# Patient Record
Sex: MALE | Race: WHITE | Employment: FULL TIME | URBAN - METROPOLITAN AREA
[De-identification: names, ages, dates, MRNs, and addresses within clinical notes are randomized per-mention and may not be internally consistent; named-entity substitution may affect disease eponyms.]

---

## 2017-02-14 ENCOUNTER — OFFICE VISIT (OUTPATIENT)
Dept: UROLOGY | Facility: CLINIC | Age: 32
End: 2017-02-14
Payer: COMMERCIAL

## 2017-02-14 VITALS — RESPIRATION RATE: 12 BRPM | HEART RATE: 78 BPM | DIASTOLIC BLOOD PRESSURE: 98 MMHG | SYSTOLIC BLOOD PRESSURE: 140 MMHG

## 2017-02-14 DIAGNOSIS — N50.82 SCROTAL PAIN: Primary | ICD-10-CM

## 2017-02-14 PROCEDURE — 99213 OFFICE O/P EST LOW 20 MIN: CPT | Performed by: UROLOGY

## 2017-02-14 RX ORDER — DIPHENOXYLATE HYDROCHLORIDE AND ATROPINE SULFATE 2.5; .025 MG/1; MG/1
1 TABLET ORAL
COMMUNITY
Start: 2016-04-26

## 2017-02-14 RX ORDER — TRIAMCINOLONE ACETONIDE 1 MG/G
CREAM TOPICAL
COMMUNITY
Start: 2015-12-02 | End: 2017-02-14

## 2017-02-14 RX ORDER — DESONIDE 0.5 MG/G
CREAM TOPICAL
COMMUNITY
Start: 2017-02-14

## 2017-02-14 ASSESSMENT — PAIN SCALES - GENERAL: PAINLEVEL: MODERATE PAIN (4)

## 2017-02-14 NOTE — MR AVS SNAPSHOT
After Visit Summary   2/14/2017    Pietro Amaro    MRN: [id number]           Patient Information     Date Of Birth          1985        Visit Information        Provider Department      2/14/2017 3:15 PM KENY Cowart MD [facility]        Today's Diagnoses     Scrotal pain    -  1      Care Instructions    Per Physician's instructions          Follow-ups after your visit        Who to contact     If you have questions or need follow up information about today's clinic visit or your schedule please contact [facility] directly at [phone].  Normal or non-critical lab and imaging results will be communicated to you by ReVision Opticshart, letter or phone within 4 business days after the clinic has received the results. If you do not hear from us within 7 days, please contact the clinic through Avegantt or phone. If you have a critical or abnormal lab result, we will notify you by phone as soon as possible.  Submit refill requests through Interhyp or call your pharmacy and they will forward the refill request to us. Please allow 3 business days for your refill to be completed.          Additional Information About Your Visit        MyChart Information     Interhyp gives you secure access to your electronic health record. If you see a primary care provider, you can also send messages to your care team and make appointments. If you have questions, please call your primary care clinic.  If you do not have a primary care provider, please call [phone] and they will assist you.        Care EveryWhere ID     This is your Care EveryWhere ID. This could be used by other organizations to access your [place] medical records  [id number]        Your Vitals Were     Pulse Respirations                78 12           Blood Pressure from Last 3 Encounters:   02/14/17 (!) 140/98   04/03/14 147/90   06/07/13 136/89    Weight from Last 3 Encounters:   04/03/14 188 lb (85.3 kg)   06/07/13  185 lb (83.9 kg)   04/23/13 189 lb (85.7 kg)              Today, you had the following     No orders found for display         Today's Medication Changes          These changes are accurate as of: 2/14/17 11:59 PM.  If you have any questions, ask your nurse or doctor.               Stop taking these medicines if you haven't already. Please contact your care team if you have questions.     doxycycline 100 MG capsule   Commonly known as:  VIBRAMYCIN   Stopped by:  KENY Cowart MD           NO ACTIVE MEDICATIONS   Stopped by:  KENY Cowart MD           triamcinolone 0.1 % cream   Commonly known as:  KENALOG   Stopped by:  KENY Cowart MD                    Primary Care Provider Office Phone # Fax #    Sam Ingram [phone]       [facility] [address] [facility] [address]        Thank you!     Thank you for choosing [facility]  for your care. Our goal is always to provide you with excellent care. Hearing back from our patients is one way we can continue to improve our services. Please take a few minutes to complete the written survey that you may receive in the mail after your visit with us. Thank you!             Your Updated Medication List - Protect others around you: Learn how to safely use, store and throw away your medicines at www.disposemymeds.org.          This list is accurate as of: 2/14/17 11:59 PM.  Always use your most recent med list.                   Brand Name Dispense Instructions for use    desonide 0.05 % cream    DESOWEN         MULTI-VITAMINS Tabs      Take 1 tablet by mouth

## 2017-02-14 NOTE — NURSING NOTE
"Chief Complaint   Patient presents with     Groin Pain       Initial BP (!) 140/98 (BP Location: Right arm, Patient Position: Right side, Cuff Size: Adult Large)  Pulse 78  Resp 12 Estimated body mass index is 29.44 kg/(m^2) as calculated from the following:    Height as of 4/3/14: 5' 7\" (1.702 m).    Weight as of 4/3/14: 188 lb (85.3 kg).  Medication Reconciliation: complete     Carmel Mims MA      "

## 2017-02-15 NOTE — PROGRESS NOTES
Appointment source: Established Patient  Patient name: Pietro Amaro  Urology Staff: Ken Cowart MD    Subjective: This is a 31 year old year old male returning for follow up of chronic scrotal pain    Objective:  Had bilateral hydrocele repair in 2013 which had minimal impact on his chronic scrotal discomfort.    Pain goes away after ejaculation.    On examination today there are no palpable abnormalities other than some degree of discomfort with palpation right worse than left and thickening of the intrascrotal posterior aspect of each spermatic cord/epidiymides consistent with prior hydrocele surgery.    No evidence of inguinal hernia.    The level of pain is manageable and does not require the use of any analgesics.    He is aware of the improbability of alleviating this problem after so many years but would like to continue exploring options.     Assessment:  Chronic scrotal pain.    Plan:  I suggested that he see Dr. Waite.    Total time 20 minutes, counseling 15 minutes discussing management of chronic scrotal pain

## 2017-03-02 ENCOUNTER — TELEPHONE (OUTPATIENT)
Dept: UROLOGY | Facility: CLINIC | Age: 32
End: 2017-03-02

## 2017-03-02 NOTE — TELEPHONE ENCOUNTER
Spoke to pt and advised that he follow up with Dr. Waite per LOV note. Pt agreed with plan and stated he thought the clinic at [facility] was supposed to call him with an appointment.  Called [facility] and first appointment for patient was April 14th, but they stated they could not call the pt from appointment line to make appt with pt.  Advised pt to call and make the appt.  Informed him that Dr. Cowart would be consulted to see if waiting until April was ok. Pt verbalized agreement with plan. Please advise if Pt can wait to be seen until April 14th. Thank you.  Indira SERNA RN BSN PHN  Specialty Clinics

## 2017-03-02 NOTE — TELEPHONE ENCOUNTER
Reason for Call:  Other call back    Detailed comments: pt calling stating he would like to know if Dr. Cowart followed up w/ the other provider he discussed at last visit. Would like to know what the next step is?    Phone Number Patient can be reached at: Home number on file [phone] (home)    Best Time: any     Can we leave a detailed message on this number? YES    Call taken on 3/2/2017 at 9:17 AM by Mandie Greene

## 2017-03-03 ENCOUNTER — OFFICE VISIT (OUTPATIENT)
Dept: UROLOGY | Facility: CLINIC | Age: 32
End: 2017-03-03

## 2017-03-03 VITALS
HEIGHT: 66 IN | WEIGHT: 195 LBS | BODY MASS INDEX: 31.34 KG/M2 | SYSTOLIC BLOOD PRESSURE: 158 MMHG | HEART RATE: 90 BPM | DIASTOLIC BLOOD PRESSURE: 102 MMHG

## 2017-03-03 DIAGNOSIS — N45.1 EPIDIDYMITIS, BILATERAL: Primary | ICD-10-CM

## 2017-03-03 DIAGNOSIS — N45.1 EPIDIDYMITIS, BILATERAL: ICD-10-CM

## 2017-03-03 PROCEDURE — 89322 SEMEN ANAL STRICT CRITERIA: CPT

## 2017-03-03 ASSESSMENT — ENCOUNTER SYMPTOMS
SMELL DISTURBANCE: 0
SINUS CONGESTION: 1
SORE THROAT: 0
TROUBLE SWALLOWING: 0
SINUS PAIN: 1
HOARSE VOICE: 0
TASTE DISTURBANCE: 0
NECK MASS: 0

## 2017-03-03 ASSESSMENT — PAIN SCALES - GENERAL: PAINLEVEL: MODERATE PAIN (4)

## 2017-03-03 NOTE — MR AVS SNAPSHOT
After Visit Summary   3/3/2017    Pietro Amaro    MRN: [id number]           Patient Information     Date Of Birth          1985        Visit Information        Provider Department      3/3/2017 7:20 AM Nawaf Waite MD [facility] and [facility] for Prostate and Urologic Cancers        Today's Diagnoses     Epididymitis, bilateral    -  1      Care Instructions    Schedule appointment for semen analysis.    Schedule appointment for testicular ultrasound.    Follow up with Dr. Waite for cord block procedure.    It was a pleasure meeting with you today.  Thank you for allowing me and my team the privilege of caring for you today.  YOU are the reason we are here, and I truly hope we provided you with the excellent service you deserve.  Please let us know if there is anything else we can do for you so that we can be sure you are leaving completely satisfied with your care experience.      Shahrzad Espinoza JOSHUA        Follow-ups after your visit        Your next 10 appointments already scheduled     Mar 04, 2017  3:00 PM CST   US TESTICULAR AND SCROTUM with UCUS3   [facility] US ([facility])    [address] [place] [address] [phone]           Please bring a list of your medicines (including vitamins, minerals and over-the-counter drugs). Also, tell your doctor about any allergies you may have. Wear comfortable clothes and leave your valuables at home.  You do not need to do anything special to prepare for your exam.  Please call the Imaging Department at your exam site with any questions.            Mar 23, 2017  7:00 AM CDT   (Arrive by 6:45 AM)   Return Visit with Nawaf Waite MD   [facility] and [facility] for Prostate and Urologic Cancers ([facility])    [address] [place] [address]  [address] [facility] [address] [phone]              Future tests that were ordered for you today   "   Open Future Orders        Priority Expected Expires Ordered    US Testicular and Scrotum Routine 6/1/2017 3/3/2018 3/3/2017    Semen Analysis, Strict Morphology ([facility]) Routine 6/11/2017 3/4/2018 3/3/2017            Who to contact     Please call your clinic at [phone] to:    Ask questions about your health    Make or cancel appointments    Discuss your medicines    Learn about your test results    Speak to your doctor   If you have compliments or concerns about an experience at your clinic, or if you wish to file a complaint, please contact [facility] Patient Relations at [phone] or email us at [email or web address]         Additional Information About Your Visit        ResponseTek Information     ResponseTek gives you secure access to your electronic health record. If you see a primary care provider, you can also send messages to your care team and make appointments. If you have questions, please call your primary care clinic.  If you do not have a primary care provider, please call [phone] and they will assist you.      ResponseTek is an electronic gateway that provides easy, online access to your medical records. With ResponseTek, you can request a clinic appointment, read your test results, renew a prescription or communicate with your care team.     To access your existing account, please contact your [facility] or call [phone] for assistance.        Care EveryWhere ID     This is your Care EveryWhere ID. This could be used by other organizations to access your [facility] medical records  [id number]        Your Vitals Were     Pulse Height BMI (Body Mass Index)             90 1.676 m (5' 6\") 31.47 kg/m2          Blood Pressure from Last 3 Encounters:   03/03/17 (!) 158/102   02/14/17 (!) 140/98   04/03/14 147/90    Weight from Last 3 Encounters:   03/03/17 88.5 kg (195 lb)   04/03/14 85.3 kg (188 lb)   06/07/13 83.9 kg (185 lb)            "    Primary Care Provider Office Phone # Fax #    Sam Ingram [phone]       [facility] [address] [facility] [address]        Thank you!     Thank you for choosing [facility]OLOG[facility] AND [place] FOR PROSTATE AND UROLOGIC CANCERS  for your care. Our goal is always to provide you with excellent care. Hearing back from our patients is one way we can continue to improve our services. Please take a few minutes to complete the written survey that you may receive in the mail after your visit with us. Thank you!             Your Updated Medication List - Protect others around you: Learn how to safely use, store and throw away your medicines at www.disposemymeds.org.          This list is accurate as of: 3/3/17  8:08 AM.  Always use your most recent med list.                   Brand Name Dispense Instructions for use    desonide 0.05 % cream    DESOWEN         MULTI-VITAMINS Tabs      Take 1 tablet by mouth

## 2017-03-03 NOTE — NURSING NOTE
"Chief Complaint   Patient presents with     Consult     New patient consult for testicular pain       Initial Ht 1.676 m (5' 6\")  Wt 88.5 kg (195 lb)  BMI 31.47 kg/m2 Estimated body mass index is 31.47 kg/(m^2) as calculated from the following:    Height as of this encounter: 1.676 m (5' 6\").    Weight as of this encounter: 88.5 kg (195 lb).  Medication Reconciliation: complete     VEL Morin    "

## 2017-03-03 NOTE — LETTER
"3/3/2017       RE: Pietro Amaro  [address] [place] [address]     Dear Colleague,    Thank you for referring your patient, Pietro Amaro, to the [facility] AND [facility] FOR PROSTATE AND UROLOGIC CANCERS at [facility]. Please see a copy of my visit note below.      Pt with chronic bilateral scrotal pain.  Referral from Dr. Cowart.      Bilateral varicocele repair 2004, bilateral testis ache predates that surgery.    Bilateral hydrocele repair 2013, did not reduce the pain.       Pain goes away after ejaculation.  Comes back 2-3d after ejaculation.    multiple courses of antibiotics have not helped.  Does have back pain- L5 injury 2013, sees a chiropractor for that.  Has not tried pelvic floor PT.  NSAIDS do not help.    Pain is not positional.  Ice helps a little bit.  Skin gets red and hot when pain present.   ? Chronic regional pain syndrome.    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    Answers for HPI/ROS submitted by the patient on 3/3/2017   General Symptoms: No  Skin Symptoms: No  HENT Symptoms: Yes  EYE SYMPTOMS: No  HEART SYMPTOMS: No  LUNG SYMPTOMS: No  INTESTINAL SYMPTOMS: No  URINARY SYMPTOMS: No  REPRODUCTIVE SYMPTOMS: No  SKELETAL SYMPTOMS: No  BLOOD SYMPTOMS: No  NERVOUS SYSTEM SYMPTOMS: No  MENTAL HEALTH SYMPTOMS: No  Ear pain: No  Ear discharge: No  Hearing loss: No  Tinnitus: Yes  Nosebleeds: No  Congestion: Yes  Sinus pain: Yes  Trouble swallowing: No   Voice hoarseness: No  Mouth sores: No  Sore throat: No  Tooth pain: No  Gum tenderness: No  Bleeding gums: No  Change in taste: No  Change in sense of smell: No  Dry mouth: No  Hearing aid used: No  Neck lump: No    EXAM:  BP (!) 158/102  Pulse 90  Ht 1.676 m (5' 6\")  Wt 88.5 kg (195 lb)  BMI 31.47 kg/m2   :no inguinal hernias, normal scrotum, penis,  testes normal - minimally tender to palpation, very sensitive epididymis bilaterally.  rectal exam deferred    A-  Chronic " epididymis pain    Plan  - Chronic pain in epididymis. Options I discussed with him today were:  1.  Observation  2. Conservative management with sitz bath, nsaids, repeat trial abx.  3. Referral to pain specialist such as Dr. Subhash Green From [facility] for regional anesthesia or nerve block procedures.  4. Epididymectomy ( estimate 90% improvement, 60% pain cure)  5. If he responds completely to a cord block with 1% lidocaine: microscopic denervation of the spermatic cord ( estimate 90% chance pain improvement, 80+% chance pain cure in my personal experience).  6. Orchiectomy, estimate ~95% pain cure, but could have phantom pain.     We decided to have him do a semen analysis to see if he has bilateral epididymis obstruction or not.      Semen analysis ASAP and return to clinic for spermatic cord block.  Would probably have him see pain clinic before microscopic denervation of the spermatic cord given skin color changes that may be typical for RSD/ CRPS.    Martha IVY     25min visit, over 50% face to face in counseling/discussion of above pain management options.

## 2017-03-03 NOTE — PATIENT INSTRUCTIONS
Schedule appointment for semen analysis.    Schedule appointment for testicular ultrasound.    Follow up with Dr. Waite for cord block procedure.    It was a pleasure meeting with you today.  Thank you for allowing me and my team the privilege of caring for you today.  YOU are the reason we are here, and I truly hope we provided you with the excellent service you deserve.  Please let us know if there is anything else we can do for you so that we can be sure you are leaving completely satisfied with your care experience.      VEL Morin

## 2017-03-03 NOTE — PROGRESS NOTES
"  Pt with chronic bilateral scrotal pain.  Referral from Dr. Cowart.      Bilateral varicocele repair 2004, bilateral testis ache predates that surgery.    Bilateral hydrocele repair 2013, did not reduce the pain.       Pain goes away after ejaculation.  Comes back 2-3d after ejaculation.    multiple courses of antibiotics have not helped.  Does have back pain- L5 injury 2013, sees a chiropractor for that.  Has not tried pelvic floor PT.  NSAIDS do not help.    Pain is not positional.  Ice helps a little bit.  Skin gets red and hot when pain present.   ? Chronic regional pain syndrome.    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    Answers for HPI/ROS submitted by the patient on 3/3/2017   General Symptoms: No  Skin Symptoms: No  HENT Symptoms: Yes  EYE SYMPTOMS: No  HEART SYMPTOMS: No  LUNG SYMPTOMS: No  INTESTINAL SYMPTOMS: No  URINARY SYMPTOMS: No  REPRODUCTIVE SYMPTOMS: No  SKELETAL SYMPTOMS: No  BLOOD SYMPTOMS: No  NERVOUS SYSTEM SYMPTOMS: No  MENTAL HEALTH SYMPTOMS: No  Ear pain: No  Ear discharge: No  Hearing loss: No  Tinnitus: Yes  Nosebleeds: No  Congestion: Yes  Sinus pain: Yes  Trouble swallowing: No   Voice hoarseness: No  Mouth sores: No  Sore throat: No  Tooth pain: No  Gum tenderness: No  Bleeding gums: No  Change in taste: No  Change in sense of smell: No  Dry mouth: No  Hearing aid used: No  Neck lump: No    EXAM:  BP (!) 158/102  Pulse 90  Ht 1.676 m (5' 6\")  Wt 88.5 kg (195 lb)  BMI 31.47 kg/m2   :no inguinal hernias, normal scrotum, penis,  testes normal - minimally tender to palpation, very sensitive epididymis bilaterally.  rectal exam deferred    A-  Chronic epididymis pain    Plan  - Chronic pain in epididymis. Options I discussed with him today were:  1.  Observation  2. Conservative management with sitz bath, nsaids, repeat trial abx.  3. Referral to pain specialist such as Dr. Subhash Green From [facility] for regional anesthesia or nerve block procedures.  4. Epididymectomy ( " estimate 90% improvement, 60% pain cure)  5. If he responds completely to a cord block with 1% lidocaine: microscopic denervation of the spermatic cord ( estimate 90% chance pain improvement, 80+% chance pain cure in my personal experience).  6. Orchiectomy, estimate ~95% pain cure, but could have phantom pain.     We decided to have him do a semen analysis to see if he has bilateral epididymis obstruction or not.      Semen analysis ASAP and return to clinic for spermatic cord block.  Would probably have him see pain clinic before microscopic denervation of the spermatic cord given skin color changes that may be typical for RSD/ CRPS.    Martha IVY     25min visit, over 50% face to face in counseling/discussion of above pain management options.

## 2017-03-07 LAB
ABNORMAL SPERM: 92 MORPHOLOGY
ABSTINENCE DAYS: 2 DAYS (ref 2–7)
AGGLUTINATION: NORMAL YES/NO
ANALYSIS TEMP - CENTIGRADE: 22 CENTIGRADE
CELL FRAGMENTS: NORMAL %
COLLECTION METHOD: NORMAL
COLLECTION SITE: NORMAL
CONSENT TO RELEASE TO PARTNER: YES
HEAD DEFECT: 92
IMMATURE SPERM: NORMAL %
IMMOTILE: 24 %
LAB RECEIPT TIME: NORMAL
LIQUEFIED: NORMAL YES/NO
MIDPIECE DEFECT: 31
NON-PROGRESSIVE MOTILITY: 3 %
NORMAL SPERM: 8 % NORMAL FORMS (ref 4–?)
PROGRESSIVE MOTILITY: 73 % (ref 32–?)
ROUND CELLS: 0.8 MILLION/ML (ref ?–2)
SPECIMEN CONCENTRATION: 80 MILLION/ML (ref 15–?)
SPECIMEN PH: 8 PH (ref 7.2–?)
SPECIMEN TYPE: NORMAL
SPECIMEN VOL UR: 4.4 ML (ref 1.5–?)
TAIL DEFECT: 6
TIME OF ANALYSIS: NORMAL
TOTAL NUMBER: 352 MILLION (ref 39–?)
TOTAL PROGRESSIVE MOTILE: 257 MILLION (ref 15.6–?)
VISCOUS: NORMAL YES/NO
VITALITY: NORMAL % (ref 58–?)
WBC SPECIMEN: NORMAL %

## 2017-03-07 NOTE — PROGRESS NOTES
Dear Pietro,   Here are your recent results.      Your semen analysis is normal.  I doubt there is any blockage of sperm whatsoever.  Thank You  Please let me know if you have any questions.      Nawaf Waite MD

## 2017-03-22 ENCOUNTER — PRE VISIT (OUTPATIENT)
Dept: UROLOGY | Facility: CLINIC | Age: 32
End: 2017-03-22

## 2017-03-22 NOTE — TELEPHONE ENCOUNTER
Patient coming in for spermatic cord block. Left message with patient about procedure and to call clinic with questions. Patient has completed imaging and semen analysis and are in epic

## 2017-03-23 ENCOUNTER — OFFICE VISIT (OUTPATIENT)
Dept: UROLOGY | Facility: CLINIC | Age: 32
End: 2017-03-23

## 2017-03-23 VITALS — HEART RATE: 70 BPM | SYSTOLIC BLOOD PRESSURE: 144 MMHG | DIASTOLIC BLOOD PRESSURE: 70 MMHG

## 2017-03-23 DIAGNOSIS — N50.819 EPIDIDYMAL PAIN: Primary | ICD-10-CM

## 2017-03-23 RX ORDER — DOXYCYCLINE 100 MG/1
100 CAPSULE ORAL 2 TIMES DAILY
Qty: 42 CAPSULE | Refills: 1 | Status: SHIPPED | OUTPATIENT
Start: 2017-03-23 | End: 2017-06-15

## 2017-03-23 ASSESSMENT — PAIN SCALES - GENERAL: PAINLEVEL: MILD PAIN (3)

## 2017-03-23 NOTE — MR AVS SNAPSHOT
After Visit Summary   3/23/2017    Pietro Amaro    MRN: [id number]           Patient Information     Date Of Birth          1985        Visit Information        Provider Department      3/23/2017 7:00 AM Nawaf Waite MD [facility] and [facility] for Prostate and Urologic Cancers        Today's Diagnoses     Epididymal pain    -  1       Follow-ups after your visit        Follow-up notes from your care team     Return if symptoms worsen or fail to improve.      Who to contact     Please call your clinic at [phone] to:    Ask questions about your health    Make or cancel appointments    Discuss your medicines    Learn about your test results    Speak to your doctor   If you have compliments or concerns about an experience at your clinic, or if you wish to file a complaint, please contact [facility] Patient Relations at [phone] or email us at [email or web address]         Additional Information About Your Visit        MyChart Information     S[employer] Healt[employer] gives you secure access to your electronic health record. If you see a primary care provider, you can also send messages to your care team and make appointments. If you have questions, please call your primary care clinic.  If you do not have a primary care provider, please call [phone] and they will assist you.      Wingz is an electronic gateway that provides easy, online access to your medical records. With Wingz, you can request a clinic appointment, read your test results, renew a prescription or communicate with your care team.     To access your existing account, please contact your [facility] or call [phone] for assistance.        Care EveryWhere ID     This is your Care EveryWhere ID. This could be used by other organizations to access your [facility] medical records  [id number]        Your Vitals Were     Pulse                   70             Blood Pressure from Last 3 Encounters:   03/23/17 144/70   03/03/17 (!) 158/102   02/14/17 (!) 140/98    Weight from Last 3 Encounters:   03/03/17 88.5 kg (195 lb)   04/03/14 85.3 kg (188 lb)   06/07/13 83.9 kg (185 lb)              We Performed the Following     INJECTION NERVE BLOCK, OTHER PERIPHERAL          Today's Medication Changes          These changes are accurate as of: 3/23/17  7:52 AM.  If you have any questions, ask your nurse or doctor.               Start taking these medicines.        Dose/Directions    doxycycline 100 MG capsule   Commonly known as:  VIBRAMYCIN   Used for:  Epididymal pain   Started by:  Nawaf Waite MD        Dose:  100 mg   Take 1 capsule (100 mg) by mouth 2 times daily   Quantity:  42 capsule   Refills:  1            Where to get your medicines      These medications were sent to [facility] [address] [place] [address]  [address] [facility] [address]     Phone:  [phone]     doxycycline 100 MG capsule                Primary Care Provider Office Phone # Fax #    Sam Ingram [phone]       [facility] [address] [facility] [address]        Thank you!     Thank you for choosing [facility] UROLOGY AND [facility] FOR PROSTATE AND UROLOGIC CANCERS  for your care. Our goal is always to provide you with excellent care. Hearing back from our patients is one way we can continue to improve our services. Please take a few minutes to complete the written survey that you may receive in the mail after your visit with us. Thank you!             Your Updated Medication List - Protect others around you: Learn how to safely use, store and throw away your medicines at www.disposemymeds.org.          This list is accurate as of: 3/23/17  7:52 AM.  Always use your most recent med list.                   Brand Name Dispense Instructions for use    desonide 0.05 % cream    DESOWEN         doxycycline 100 MG capsule     VIBRAMYCIN    42 capsule    Take 1 capsule (100 mg) by mouth 2 times daily       MULTI-VITAMINS Tabs      Take 1 tablet by mouth

## 2017-03-23 NOTE — PROGRESS NOTES
Pt here with chronic bilateral epididymis pain L>R.  Pain resolves for 2-3 days with ejaculation , then recurs.   Semen analysis was normal on all measures.    Procedure Note    Pre-operative diagnosis: Left scrotal pain   Post-operative diagnosis same   Procedure: Left cord block   Surgeon: Nawaf Waite MD   Assistants(s): none   Estimated blood loss: None    Specimens: None   Findings: >95% relief of pain after injection.      Pt positioned supine.  Genitals prepped.    Left cord block done today, 10cc 2% lidocaine into the upper left spermatic cord.  Pt had essentially complete resolution of the scrotal pain immediately after the injection, better than 95% relief.    He'd tried only Cipro as dedicated epididymis antibiotics.  Will try doxycycline x 3 weeks.    Assessment-  Bilateral epididymis pain.  Left side pain resolved with left-sided cord block.    Plan  - try doxycycline  X 3 weeks.  - if antibiotics not effective and pain bad enough to warrant microscopic denervation of the spermatic cord, he'll call to schedule surgery for left denervation.    10min visit, over 50% face to face in counseling/discussion of chronic scrotal pain.

## 2017-03-23 NOTE — LETTER
3/23/2017       RE: Pietro Amaro  [address] [place] [address]     Dear Colleague,    Thank you for referring your patient, Pietro Amaro, to the [facility] UROLOGY AND INST FOR PROSTATE AND UROLOGIC CANCERS at [facility]. Please see a copy of my visit note below.    Pt here with chronic bilateral epididymis pain L>R.  Pain resolves for 2-3 days with ejaculation , then recurs.   Semen analysis was normal on all measures.    Procedure Note    Pre-operative diagnosis: Left scrotal pain   Post-operative diagnosis same   Procedure: Left cord block   Surgeon: Nawaf Waite MD   Assistants(s): none   Estimated blood loss: None    Specimens: None   Findings: >95% relief of pain after injection.      Pt positioned supine.  Genitals prepped.    Left cord block done today, 10cc 2% lidocaine into the upper left spermatic cord.  Pt had essentially complete resolution of the scrotal pain immediately after the injection, better than 95% relief.    He'd tried only Cipro as dedicated epididymis antibiotics.  Will try doxycycline x 3 weeks.    Assessment-  Bilateral epididymis pain.  Left side pain resolved with left-sided cord block.    Plan  - try doxycycline  X 3 weeks.  - if antibiotics not effective and pain bad enough to warrant microscopic denervation of the spermatic cord, he'll call to schedule surgery for left denervation.    10min visit, over 50% face to face in counseling/discussion of chronic scrotal pain.    Again, thank you for allowing me to participate in the care of your patient.      Sincerely,    Nawaf Waite MD

## 2017-03-23 NOTE — NURSING NOTE
Chief Complaint   Patient presents with     RECHECK     testicle pain/spermatic cord block     Invasive Procedure Safety Checklist:    Procedure: cord block    Action: Complete sections and checkboxes as appropriate.    Pre-procedure:  1. Patient ID Verified with 2 identifiers (Susanna and  or MRN) : YES    2. Procedure and site verified with patient/designee (when able) : YES    3. Accurate consent documentation in medical record : YES    4. H&P (or appropriate assessment) documented in medical record : NO  H&P must be up to 30 days prior to procedure an updated within 24 hours of                 Procedure as applicable.     5. Relevant diagnostic and radiology test results appropriately labeled and displayed as applicable : NO    6. Blood products, implants, devices, and/or special equipment available for the procedure as applicable : NO    7. Procedure site(s) marked with provider initials [Exclusions: none] : NO    8. Marking not required. Reason : Yes  Procedure does not require site marking    Time Out:     Time-Out performed immediately prior to starting procedure, including verbal and active participation of all team members addressing: YES    1. Correct patient identity.  2. Confirmed that the correct side and site are marked.  3. An accurate procedure to be done.  4. Agreement on the procedure to be done.  5. Correct patient position.  6. Relevant images and results are properly labeled and appropriately displayed.  7. The need to administer antibiotics or fluids for irrigation purposes during the procedure as applicable.  8. Safety precautions based on patient history or medication use.    During Procedure: Verification of correct person, site, and procedure occurs any time the responsibility for care of the patient is transferred to another member of the care team.    Julio CROWDER

## 2017-03-23 NOTE — NURSING NOTE
Lidocaine 2%  Drug Amount given = 10 ml  Drug Amount wasted = 10 ml  [id number]      Julio Lopez CMA  March 23, 2017

## 2017-06-08 ENCOUNTER — PRE VISIT (OUTPATIENT)
Dept: UROLOGY | Facility: CLINIC | Age: 32
End: 2017-06-08

## 2017-06-08 NOTE — TELEPHONE ENCOUNTER
Patient coming in for Cord block. Patient saw Dr Waite on 3/23/17. My chart message sent to patient

## 2017-06-15 ENCOUNTER — OFFICE VISIT (OUTPATIENT)
Dept: UROLOGY | Facility: CLINIC | Age: 32
End: 2017-06-15

## 2017-06-15 VITALS
DIASTOLIC BLOOD PRESSURE: 91 MMHG | HEIGHT: 66 IN | WEIGHT: 195 LBS | HEART RATE: 105 BPM | BODY MASS INDEX: 31.34 KG/M2 | SYSTOLIC BLOOD PRESSURE: 162 MMHG

## 2017-06-15 DIAGNOSIS — N50.819 EPIDIDYMAL PAIN: Primary | ICD-10-CM

## 2017-06-15 ASSESSMENT — PAIN SCALES - GENERAL: PAINLEVEL: MILD PAIN (3)

## 2017-06-15 NOTE — LETTER
"6/15/2017       RE: Pietro Amaro  [address] [place] [address]     Dear Colleague,    Thank you for referring your patient, Pietro Amaro, to the [facility] UROLOGY AND INST FOR PROSTATE AND UROLOGIC CANCERS at [facility]. Please see a copy of my visit note below.      Pt with persisting epididymal hypertension ( \"blue balls\").  He responded partially to a cord block at the last visit but still felt pressure even though testis was numbed.  He's not interested in surgery at this time.  Left side more painful than right side.    I discussed with him pain clinic or neurology referral- it does not seem like either would be of much yield.  We discussed doing a vasoepididymostomy, but again not sure if this would help or make things worse.    A-   Epididymal HTN.    Plan  - continue with frequent sexual release as this fixes the pain every time.  - PRN urology follow-up.    Symptoms are completely relieved by ejaculation but return in 2-3 days.    15min visit, over 50% face to face in counseling/discussion of pain management.    Again, thank you for allowing me to participate in the care of your patient.    Nawaf Waite MD    "

## 2017-06-15 NOTE — PROGRESS NOTES
"  Pt with persisting epididymal hypertension ( \"blue balls\").  He responded partially to a cord block at the last visit but still felt pressure even though testis was numbed.  He's not interested in surgery at this time.  Left side more painful than right side.    I discussed with him pain clinic or neurology referral- it does not seem like either would be of much yield.  We discussed doing a vasoepididymostomy, but again not sure if this would help or make things worse.    A-   Epididymal HTN.    Plan  - continue with frequent sexual release as this fixes the pain every time.  - PRN urology follow-up.    Symptoms are completely relieved by ejaculation but return in 2-3 days.    Martha IVY       15min visit, over 50% face to face in counseling/discussion of pain management.  "

## 2017-06-15 NOTE — MR AVS SNAPSHOT
After Visit Summary   6/15/2017    Pietro Amaro    MRN: [id number]           Patient Information     Date Of Birth          1985        Visit Information        Provider Department      6/15/2017 11:20 AM Nawaf Waite MD [facility] Urology and [facility] for Prostate and Urologic Cancers        Today's Diagnoses     Epididymal pain    -  1       Follow-ups after your visit        Follow-up notes from your care team     Return if symptoms worsen or fail to improve.      Who to contact     Please call your clinic at [phone] to:    Ask questions about your health    Make or cancel appointments    Discuss your medicines    Learn about your test results    Speak to your doctor   If you have compliments or concerns about an experience at your clinic, or if you wish to file a complaint, please contact [facility] Patient Relations at [phone] or email us at [email or web address]         Additional Information About Your Visit        MyChart Information     [employer] gives you secure access to your electronic health record. If you see a primary care provider, you can also send messages to your care team and make appointments. If you have questions, please call your primary care clinic.  If you do not have a primary care provider, please call [phone] and they will assist you.      [employer] is an electronic gateway that provides easy, online access to your medical records. With [employer], you can request a clinic appointment, read your test results, renew a prescription or communicate with your care team.     To access your existing account, please contact your [facility] or call [phone] for assistance.        Care EveryWhere ID     This is your Care EveryWhere ID. This could be used by other organizations to access your [facility] medical records  [id number]        Your Vitals Were     Pulse Height BMI (Body Mass Index)  "            105 1.676 m (5' 6\") 31.47 kg/m2          Blood Pressure from Last 3 Encounters:   06/15/17 (!) 162/91   03/23/17 144/70   03/03/17 (!) 158/102    Weight from Last 3 Encounters:   06/15/17 88.5 kg (195 lb)   03/03/17 88.5 kg (195 lb)   04/03/14 85.3 kg (188 lb)              Today, you had the following     No orders found for display       Primary Care Provider Office Phone # Fax #    Sam Ingram [phone]       [facility] [address] [facility] [address]        Thank you!     Thank you for choosing [facility] UROLOGY AND [facility] FOR PROSTATE AND UROLOGIC CANCERS  for your care. Our goal is always to provide you with excellent care. Hearing back from our patients is one way we can continue to improve our services. Please take a few minutes to complete the written survey that you may receive in the mail after your visit with us. Thank you!             Your Updated Medication List - Protect others around you: Learn how to safely use, store and throw away your medicines at www.disposemymeds.org.          This list is accurate as of: 6/15/17  1:32 PM.  Always use your most recent med list.                   Brand Name Dispense Instructions for use    desonide 0.05 % cream    DESOWEN         MULTI-VITAMINS Tabs      Take 1 tablet by mouth         "

## 2017-06-15 NOTE — NURSING NOTE
Chief Complaint   Patient presents with     RECHECK     discuss surgery/cord block      Julio CROWDER

## 2019-11-07 ENCOUNTER — HEALTH MAINTENANCE LETTER (OUTPATIENT)
Age: 34
End: 2019-11-07

## 2020-12-06 ENCOUNTER — HEALTH MAINTENANCE LETTER (OUTPATIENT)
Age: 35
End: 2020-12-06

## 2021-09-25 ENCOUNTER — HEALTH MAINTENANCE LETTER (OUTPATIENT)
Age: 36
End: 2021-09-25

## 2022-01-15 ENCOUNTER — HEALTH MAINTENANCE LETTER (OUTPATIENT)
Age: 37
End: 2022-01-15

## 2022-12-26 ENCOUNTER — HEALTH MAINTENANCE LETTER (OUTPATIENT)
Age: 37
End: 2022-12-26

## (undated) RX ORDER — LIDOCAINE HYDROCHLORIDE 20 MG/ML
INJECTION, SOLUTION INFILTRATION; PERINEURAL
Status: DISPENSED
Start: 2017-03-23